# Patient Record
Sex: MALE | URBAN - METROPOLITAN AREA
[De-identification: names, ages, dates, MRNs, and addresses within clinical notes are randomized per-mention and may not be internally consistent; named-entity substitution may affect disease eponyms.]

---

## 2018-05-23 ENCOUNTER — APPOINTMENT (RX ONLY)
Dept: URBAN - METROPOLITAN AREA CLINIC 48 | Facility: CLINIC | Age: 23
Setting detail: DERMATOLOGY
End: 2018-05-23

## 2018-05-23 DIAGNOSIS — B35.3 TINEA PEDIS: ICD-10-CM

## 2018-05-23 DIAGNOSIS — D485 NEOPLASM OF UNCERTAIN BEHAVIOR OF SKIN: ICD-10-CM

## 2018-05-23 PROBLEM — D48.5 NEOPLASM OF UNCERTAIN BEHAVIOR OF SKIN: Status: ACTIVE | Noted: 2018-05-23

## 2018-05-23 PROCEDURE — ? BIOPSY BY SHAVE METHOD

## 2018-05-23 PROCEDURE — ? COUNSELING

## 2018-05-23 PROCEDURE — ? PRESCRIPTION

## 2018-05-23 PROCEDURE — 11100: CPT

## 2018-05-23 PROCEDURE — 99202 OFFICE O/P NEW SF 15 MIN: CPT | Mod: 25

## 2018-05-23 RX ORDER — KETOCONAZOLE 20 MG/G
CREAM TOPICAL
Qty: 1 | Refills: 4 | Status: ERX | COMMUNITY
Start: 2018-05-23

## 2018-05-23 RX ADMIN — KETOCONAZOLE: 20 CREAM TOPICAL at 00:00

## 2018-05-23 ASSESSMENT — LOCATION SIMPLE DESCRIPTION DERM
LOCATION SIMPLE: LEFT CALF
LOCATION SIMPLE: RIGHT PLANTAR SURFACE

## 2018-05-23 ASSESSMENT — LOCATION DETAILED DESCRIPTION DERM
LOCATION DETAILED: RIGHT MEDIAL PLANTAR MIDFOOT
LOCATION DETAILED: LEFT DISTAL CALF

## 2018-05-23 ASSESSMENT — LOCATION ZONE DERM
LOCATION ZONE: LEG
LOCATION ZONE: FEET

## 2018-05-23 NOTE — PROCEDURE: MIPS QUALITY
Quality 110: Preventive Care And Screening: Influenza Immunization: Influenza Immunization not Administered because Patient Refused.
Detail Level: Detailed
Quality 226: Preventive Care And Screening: Tobacco Use: Screening And Cessation Intervention: Patient screened for tobacco and never smoked

## 2018-05-23 NOTE — PROCEDURE: BIOPSY BY SHAVE METHOD
X Size Of Lesion In Cm: 0
Post-Care Instructions: I reviewed with the patient in detail post-care instructions. Patient is to keep the biopsy site dry overnight, and then apply vaseline and cover daily until healed.
Type Of Destruction Used: Curettage
Electrodesiccation Text: The wound bed was treated with electrodesiccation after the biopsy was performed.
Anesthesia Type: 1% lidocaine with epinephrine
Biopsy Type: H and E
Render Post-Care Instructions In Note?: no
Billing Type: [employer]
Anesthesia Volume In Cc (Will Not Render If 0): 0.5
Consent: Written consent was obtained and risks were reviewed including but not limited to scarring, infection, bleeding, scabbing, incomplete removal, nerve damage and allergy to anesthesia.
Lab Facility: [id number]
Body Location Override (Optional - Billing Will Still Be Based On Selected Body Map Location If Applicable): lower left leg
Notification Instructions: Patient will be notified of biopsy results within one week. However, patient instructed to call the office if not contacted within two weeks.
Dressing: bandage
Was A Bandage Applied: Yes
Electrodesiccation And Curettage Text: The wound bed was treated with electrodesiccation and curettage after the biopsy was performed.
Detail Level: Detailed
Silver Nitrate Text: The wound bed was treated with silver nitrate after the biopsy was performed.
Lab: [address] [place] [address]
Wound Care: Vaseline
Cryotherapy Text: The wound bed was treated with cryotherapy after the biopsy was performed.
Biopsy Method: 15 blade
Hemostasis: Electrocautery

## 2018-06-13 ENCOUNTER — APPOINTMENT (RX ONLY)
Dept: URBAN - METROPOLITAN AREA CLINIC 48 | Facility: CLINIC | Age: 23
Setting detail: DERMATOLOGY
End: 2018-06-13

## 2018-06-13 DIAGNOSIS — B35.3 TINEA PEDIS: ICD-10-CM

## 2018-06-13 PROCEDURE — 99213 OFFICE O/P EST LOW 20 MIN: CPT

## 2018-06-13 PROCEDURE — ? COUNSELING

## 2018-06-13 ASSESSMENT — LOCATION ZONE DERM: LOCATION ZONE: FEET

## 2018-06-13 ASSESSMENT — LOCATION SIMPLE DESCRIPTION DERM: LOCATION SIMPLE: LEFT FOOT

## 2018-06-13 ASSESSMENT — LOCATION DETAILED DESCRIPTION DERM: LOCATION DETAILED: LEFT DORSAL FOOT
